# Patient Record
(demographics unavailable — no encounter records)

---

## 2024-10-25 NOTE — DISCUSSION/SUMMARY
[FreeTextEntry1] : Symptoms c/w geographic tongue. Reviewed benign condition may flare up with spicy, sour, salty or acidic foods. Recommending keeping journal to try to pinpoint targets.

## 2024-10-25 NOTE — HISTORY OF PRESENT ILLNESS
[de-identified] : Mom reports red swollen tongue for months on and off. Mom reports it seems like it's bothering her occasionally. No fever or cold-like symptoms reported at home. Normal appetite and voiding reported. [FreeTextEntry6] : Smooth, red patches on tongue coming and going for months. Rarely seems like it is bothering her. Will last 2-3 days then go away before coming back. No recent illnesses.

## 2024-10-25 NOTE — HISTORY OF PRESENT ILLNESS
[de-identified] : Mom reports red swollen tongue for months on and off. Mom reports it seems like it's bothering her occasionally. No fever or cold-like symptoms reported at home. Normal appetite and voiding reported. [FreeTextEntry6] : Smooth, red patches on tongue coming and going for months. Rarely seems like it is bothering her. Will last 2-3 days then go away before coming back. No recent illnesses.

## 2024-12-03 NOTE — PHYSICAL EXAM
[NL] : no acute distress, alert [Patent] : patent [Erythema surrounding anus] : no erythema surrounding anus [Fissure] : no fissure

## 2024-12-03 NOTE — HISTORY OF PRESENT ILLNESS
[de-identified] : Mom reports itching to the rectal area for months. Mom reports normal bowel movements. Normal appetite and voiding reported. No fever at home. [FreeTextEntry6] : anal itching on and off no erythema, no swelling improves with Vaseline stools soft, not straining